# Patient Record
(demographics unavailable — no encounter records)

---

## 2024-11-09 NOTE — DISCUSSION/SUMMARY
[FreeTextEntry1] : Arely is a 9 y.o female presenting for sore throat. Physical exam with pharyngitis- rapid strep positive for GAS. Discussed diagnosis, supportive care and Amoxicillin course. RTO as needed.

## 2024-11-09 NOTE — HISTORY OF PRESENT ILLNESS
[de-identified] : Sore throat  [FreeTextEntry6] : Arely is a 9 y.o female presenting for sore throat and vomiting. No fever. Concerned about strep. Drinking well.

## 2025-01-06 NOTE — DISCUSSION/SUMMARY
[FreeTextEntry1] :  Nine year old female WELL CHILD. Continue balanced diet with all food groups. Brush teeth twice a day with toothbrush. Recommend visit to dentist. Help child to maintain consistent daily routines and sleep schedule. School discussed. Ensure home is safe. Teach child about personal safety. Use consistent, positive discipline. Limit screen time to no more than 2 hours per day. Encourage physical activity.  Return 1 year for routine well child check.

## 2025-01-06 NOTE — HISTORY OF PRESENT ILLNESS
[Parents] : parents [Fruit] : fruit [Vegetables] : vegetables [Meat] : meat [Grains] : grains [Eggs] : eggs [Fish] : fish [Dairy] : dairy [Eats meals with family] : eats meals with family [___ stools every other day] : [unfilled]  stools every other day [Firm] : stools are firm consistency [___ voids per day] : [unfilled] voids per day [Normal] : Normal [In own bed] : In own bed [Sleeps ___ hours per night] : sleeps [unfilled] hours per night [Brushing teeth twice/d] : brushing teeth twice per day [Yes] : Patient goes to dentist yearly [Toothpaste] : Primary Fluoride Source: Toothpaste [Premenarche] : premenarche [Appropiate parent-child-sibling interaction] : appropriate parent-child-sibling interaction [Grade ___] : Grade [unfilled] [Exposure to alcohol] : no exposure to alcohol [Exposure to tobacco] : no exposure to tobacco [Exposure to electronic nicotine delivery system] : No exposure to electronic nicotine delivery system [Appropriately restrained in motor vehicle] : appropriately restrained in motor vehicle [Exposure to illicit drugs] : no exposure to illicit drugs [Supervised outdoor play] : supervised outdoor play [Wears helmet and pads] : wears helmet and pads [Monitored computer use] : monitored computer use [Up to date] : Up to date [de-identified] :  [FreeTextEntry1] :  9 year female brought to the office for Well . Has been doing well, appetite is good, sleeps well, voiding and stooling normally. Growth and development is appropriate for age

## 2025-03-22 NOTE — REVIEW OF SYSTEMS
[Sore Throat] : sore throat [Vomiting] : vomiting [Abdominal Pain] : abdominal pain [Myalgia] : myalgia [Polyuria] : polyuria [Negative] : Heme/Lymph

## 2025-03-22 NOTE — HISTORY OF PRESENT ILLNESS
[de-identified] : Back pain  [FreeTextEntry6] : Arely is a 9 y.o female presenting for back pain. abdominal pain and polyuria. Denies any pain with urination but has been going more frequently. With back pain for months as per parents- she does play basketball but no known injury or trauma. Now with left shoulder pain and lower abdominal pain. No N/V/D. No cough or congestion. Premenarchal.

## 2025-03-22 NOTE — DISCUSSION/SUMMARY
[FreeTextEntry1] : Arely is a 9 y.o female presenting for back pain and polyuria. Exam with some flank tenderness and lower abdominal tenderness on exam. POCT urinalysis with blood- discussed official UA/UC and start of antibiotics course for potential UTI. Discussed follow up after results and potential imaging for back given chronic pain. RTO as needed.

## 2025-04-01 NOTE — HISTORY OF PRESENT ILLNESS
[de-identified] : patient with sore throat for a few days and no fever and stomache no vomiting or diahrea

## 2025-04-01 NOTE — DISCUSSION/SUMMARY
[FreeTextEntry1] : Patient likely with viral pharyngitis. Rapid strep perfromed in office is negative. Will send throat culture to rule out strep. Recommend supportive care with antipyretics, salt water gargles, and if age-appropriate throat lozenges. mild red cheecks- ? fifths disease

## 2025-04-16 NOTE — DATA REVIEWED
[de-identified] : 4/1/25: AP and lateral full-length spine x-ray have been previously obtained and independently reviewed today on 4/15/2025 Right thoracic curve measuring less than 25 degrees. Open triradiate cartilage.

## 2025-04-16 NOTE — DATA REVIEWED
[de-identified] : 4/1/25: AP and lateral full-length spine x-ray have been previously obtained and independently reviewed today on 4/15/2025 Right thoracic curve measuring less than 25 degrees. Open triradiate cartilage.

## 2025-04-16 NOTE — PHYSICAL EXAM
[FreeTextEntry1] : General: Patient is awake and alert and in no acute distress, oriented to person, place, and time. Well developed, well nourished, cooperative.   Skin: The skin is intact, warm, pink, and dry over the area examined.    Eyes: normal conjunctiva, normal eyelids and pupils were equal and round.   ENT: normal ears, normal nose and normal lips.  Cardiovascular: There is brisk capillary refill in the digits of the affected extremity. They are symmetric pulses in the bilateral upper and lower extremities, positive peripheral pulses, brisk capillary refill, but no peripheral edema.  Respiratory: The patient is in no apparent respiratory distress. They're taking full deep breaths without use of accessory muscles or evidence of audible wheezes or stridor without the use of a stethoscope, normal respiratory effort.   Neurological: 5/5 motor strength in the main muscle groups of bilateral lower extremities, sensory intact in bilateral lower extremities.   Musculoskeletal: Neurological examination reveals a grade 5/5 muscle power. Deep tendon reflexes are 1+ with ankle jerk and knee jerk.  The plantars are bilaterally down going.  Superficial abdominal reflexes are symmetric and intact.  The biceps and triceps reflexes are 1+.  The Cora test is negative.  There is no asymmetry of calves, no significant leg length discrepancy or significant cafe-au-lait spots. Abdominal reflexes in all 4 quadrants present.    Examination of both the upper and lower extremities: No obvious abnormalities. 5/5 muscle strength bilaterally.  There is no gross deformity.  Patient has full range of motion of both the hips, knees, ankles, wrists, elbows, and shoulders.  Neck range of motion is full and free without any pain or spasm. Normal appearing fingers and toes. No large birthmarks noted. DTR's are intact.  Examination of back: Examination of the back reveals significant shoulder asymmetry. Significant waist asymmetry.  On forward bending, mild right thoracic and mild left thoracolumbar prominence noted.  Patient is able to bend forward and touch the toes as well bend backwards without pain.  Lateral flexion is symmetrical and is pain free.  Straight leg raising test is free to more than 70 degrees.   Neurological examination reveals a grade 5/5 muscle power.  Sensation is intact to crude touch and pinprick.  Deep tendon reflexes are 1+ with ankle jerk and knee jerk.  The plantars are bilaterally down going.  Superficial abdominal reflexes are symmetric and intact.  The biceps and triceps reflexes are 1+.     There is no hairy patch, lipoma, sinus in the back.  There is no pes cavus, asymmetry of calves, significant leg length discrepancy or significant cafe-au-lait spots.  Child is able to walk on tiptoes as well as heels without difficulty or pain. Child is able to jump and squat

## 2025-04-16 NOTE — ASSESSMENT
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 Clinical exam and imaging reviewed with parent and patient at length. Natural history discussed.  Child is 9 years of age, Risser 0, premenarchal.  Patient has significant skeletal growth potential.  Scoliosis can progress with time and growth.  Scoliosis currently measures about 13 degrees.  Observation only has been recommended.  Treatment algorithm for scoliosis has been discussed.  Bracing is warranted for curves measuring greater than 25 degrees with skeletal growth remaining.  The parent understands that the braces do not correct curves permanently and that there is 30% risk brace failure. Parent understands the risk of curve progression needing surgery. Surgery is recommended for scoliosis measuring greater than 45 degrees.  I have recommended regular exercise for back and core strengthening and postural support.  Home exercise regimen with exercises to be done at least 5 days a week has also been recommended.  Home exercise handout sheet has been provided. A prescription for outpatient physical therapy has also been provided. Activities as tolerated.  I have recommended follow-up in 6 months with AP and lateral spine x-ray at that time.  All questions answered, understanding verbalized. Patient and family in agreement with plan of care.  Natural history of spine deformity discussed. Risk of progression explained. Spine deformity can cause back pain later on and also arthritis, though usually later.. Spine deformity can affect organ systems,such as lungs, less commonly heart and GI etc over time depending on curve size and progression.Deformity can progress with growth but can continue to progress later on based on the size of the curve. It can also effect patient's height due to the curve..It usually does not impact activities and has no limitations, however activities may be limited due to pain or rarely breathlessness with large curves. Scoliosis is usually not impacted by daily activities- sleeping position, sitting position, lifting heavy weights etc, however posture and back pain can be affected by some of these.Stretching, exercises, bone health and nutrition are important factors in the long run.Spine deformity may have genetics etiology and so siblings and progenies should be evaluated.For scoliosis, curves less than 25 degrees are usually managed with observation. Bracing is warranted for curves measuring greater than 25 degrees with skeletal growth remaining.  Braces do not correct curves permanently and there is a 30% risk brace failure. Surgery is recommended for scoliosis measuring greater than 45 degrees.  This note was generated using Dragon medical dictation software. A reasonable effort has been made for proofreading its contents, but typos may still remain. If there are any questions or points of clarification needed please do not hesitate to contact my office.  We spent 45 minutes on HPI, Clinical exam, ordering/ reviewing all imaging, reviewing any existing record, reviewing findings and counseling patient to treatment, differentials, etiology, prognosis, natural history, implications on ADLs, activities limitations/modifications,  answering questions and addressing concerns, treatment goals and documenting in the EHR.

## 2025-04-16 NOTE — HISTORY OF PRESENT ILLNESS
[3] : currently ~his/her~ pain is 3 out of 10 [FreeTextEntry1] : 9 year female presents today to the clinic with her parents for an initial evaluation regarding their spinal asymmetries. She was seen by the pediatrician for history of back pain.  This has been ongoing for the past few months when laying down to go to sleep and when awakening in the morning.  She denies back pain during the day.  She participates in full activity including basketball without issue.  Pediatrician obtained x-rays which are available for review.  X-rays revealed spinal asymmetries and PMD advised family to follow up with an orthopedist. Patient has been participating in all of her normal physical activities without restrictions or discomforts.  She is pre-menarchal but parents report evidence of puberty including large growth in height. Patient denies any acute traumas or recent injuries. Patient denies any back pain, radiating pain, numbness, tingling sensations, weakness to the upper or lower extremities, radiating pain throughout extremities, and urinary/bowel incontinence.She presents today for further evaluation and management regarding the same [de-identified] : lying down

## 2025-04-16 NOTE — HISTORY OF PRESENT ILLNESS
[3] : currently ~his/her~ pain is 3 out of 10 [FreeTextEntry1] : 9 year female presents today to the clinic with her parents for an initial evaluation regarding their spinal asymmetries. She was seen by the pediatrician for history of back pain.  This has been ongoing for the past few months when laying down to go to sleep and when awakening in the morning.  She denies back pain during the day.  She participates in full activity including basketball without issue.  Pediatrician obtained x-rays which are available for review.  X-rays revealed spinal asymmetries and PMD advised family to follow up with an orthopedist. Patient has been participating in all of her normal physical activities without restrictions or discomforts.  She is pre-menarchal but parents report evidence of puberty including large growth in height. Patient denies any acute traumas or recent injuries. Patient denies any back pain, radiating pain, numbness, tingling sensations, weakness to the upper or lower extremities, radiating pain throughout extremities, and urinary/bowel incontinence.She presents today for further evaluation and management regarding the same [de-identified] : lying down

## 2025-05-27 NOTE — REVIEW OF SYSTEMS
[Negative] : Genitourinary [Fever] : fever [Sore Throat] : sore throat [Appetite Changes] : appetite changes

## 2025-05-27 NOTE — DISCUSSION/SUMMARY
[FreeTextEntry1] : 10 year girl found to be rapid strep positive. Complete 10 days of antibiotics. Use antipyretics as needed. Return for follow up in 2 weeks. After being on antibiotics for atleast 24 hours patient less likely to spread infection. Recommend increased dietary fiber and probiotic. Advised using miralax, titrating to effect. Return if symptoms worsen or persist. discuss increase MiraLAX at length and possible gi referral or lactose trial of elimination

## 2025-05-27 NOTE — HISTORY OF PRESENT ILLNESS
[de-identified] : patient is here for fever and sore throat  [FreeTextEntry6] : constipation  using miralax 1 cup each day seen in ed for pain seen about a week ago long history of constipation and seeing gi

## 2025-06-02 NOTE — PHYSICAL EXAM
[Mucoid Discharge] : mucoid discharge [Inflamed Nasal Mucosa] : inflamed nasal mucosa [Erythematous Oropharynx] : erythematous oropharynx [Wheezing] : wheezing [Rales] : rales [Crackles] : crackles [NL] : warm, clear

## 2025-06-02 NOTE — HISTORY OF PRESENT ILLNESS
[FreeTextEntry6] : Ten year old female brought to the office because of congested cough, wet cough that is worse at night. She is on day 5/10 of Amoxil for strep pharyngitis ( seen on 5/27). Her fever and throat are ok now but still not herself with fatigue/malaise . Cough much worse at night.

## 2025-06-02 NOTE — DISCUSSION/SUMMARY
[FreeTextEntry1] : Ten year old female under treatment for strep but now with clinical bronchopneumonia as well as wheezing. An albuterol treatment given in the office. Still with end expiratory wheeze. Will add Azithromycin to the Amoxil and reevaluate in 3 days. Also start on Albuterol every 6 hours.

## 2025-06-24 NOTE — PLAN
[FreeTextEntry1] : - Thank you for seeing us today!   Labs: - Please have labs (bloodwork) performed. - These can be done at any time of day (no need to be fasting) - An appointment is not needed for most tests at [facility] For locations and information: [facility]/labs   Test results: - We will contact you with results and recommendations   Pain medication:  - We recommend taking an NSAID (ibuprofen or naproxen) -- dosing based on Arely's weight.  - You can alternate with Tylenol if desired.  Torsion precautions:  - Please contact us if you have severe pain that is not relieved by pain medication - Please avoid tumbling, handstands, roller coasters, or other activities that could increase the risk.    Follow-up: - We will contact you to schedule surgery and any other appointments needed.   ----- To contact the Pediatric & Adolescent Gynecology team: - phone: [phone] -- option 1 for appointments, option 2 for clinical questions - patient portal (available for ages <13 or 18+ through [employer]/website) - email: [email or web address] (for non-urgent requests/records)   Office locations for Dr. Pemberton and Dr. Rosales: - [facility] (main office) [address], [place] [address] - [facility] [place] (Dr. Pemberton only) [address], [address], [place] [address] - [facility] (Dr. Rosales only) [address], [address], [place] [address]

## 2025-06-24 NOTE — ASSESSMENT
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  Has had episodic pain a few times a week recently -- per parents, unclear if related to cyst vs anxiety -- constipation is improved at this time.   We had a detailed discussion including the following:  - nature of dermoid cysts, potential for growth - risk of torsion and its management  - approaches to surgery, benefits & risks  - additional evaluation with labs, tumor markers   Regardless of whether her pain is caused by the cyst (e.g. from intermittent torsion) or other factors, my recommendation is to plan for surgery in the near future: laparoscopic R ovarian cystectomy.  Counseled on details of the surgery, benefits and risks, typical approach and operative course, post-op expectations and recovery.  We discussed timing, taking into consideration [place] upcoming [place] travel.  Discussed torsion precautions in the interim.   Next steps will include:  - labs including ovarian tumor markers, hormones  - surgery scheduling (likely August 2025) - torsion precautions until then  - recommend repeat sono pre-op to assess size, as this may influence surgical approach.   Plan & recommendations shared with patient/family as below.  All questions were answered, and understanding was expressed. They were encouraged to contact us with additional questions or concerns.   Loraine Pemberton MD Director, Pediatric & Adolescent Gynecology [facility] Office: [phone]

## 2025-06-24 NOTE — PLAN
[FreeTextEntry1] : - Thank you for seeing us today!   Labs: - Please have labs (bloodwork) performed. - These can be done at any time of day (no need to be fasting) - An appointment is not needed for most tests at [facility] For locations and information: [facility]/labs   Test results: - We will contact you with results and recommendations   Pain medication:  - We recommend taking an NSAID (ibuprofen or naproxen) -- dosing based on Arely's weight.  - You can alternate with Tylenol if desired.  Torsion precautions:  - Please contact us if you have severe pain that is not relieved by pain medication - Please avoid tumbling, handstands, roller coasters, or other activities that could increase the risk.    Follow-up: - We will contact you to schedule surgery and any other appointments needed.   ----- To contact the Pediatric & Adolescent Gynecology team: - phone: [phone] -- option 1 for appointments, option 2 for clinical questions - patient portal (available for ages <13 or 18+ through [employer]/website) - email: [email or web address] (for non-urgent requests/records)   Office locations for Dr. Pemberton and Dr. Rosales: - [facility] (main office) [address], [address], [place] [address] - [facility] [place] (Dr. Pemberton only) [address], [place] [address] - [facility] [place] (Dr. Rosales only) [address], [address], [place] [address]

## 2025-06-24 NOTE — ASSESSMENT
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  Has had episodic pain a few times a week recently -- per parents, unclear if related to cyst vs anxiety -- constipation is improved at this time.   We had a detailed discussion including the following:  - nature of dermoid cysts, potential for growth - risk of torsion and its management  - approaches to surgery, benefits & risks  - additional evaluation with labs, tumor markers   Regardless of whether her pain is caused by the cyst (e.g. from intermittent torsion) or other factors, my recommendation is to plan for surgery in the near future: laparoscopic R ovarian cystectomy.  Counseled on details of the surgery, benefits and risks, typical approach and operative course, post-op expectations and recovery.  We discussed timing, taking into consideration their upcoming overseas travel.  Discussed torsion precautions in the interim.   Next steps will include:  - labs including ovarian tumor markers, hormones  - surgery scheduling (likely August 2025) - torsion precautions until then  - recommend repeat sono pre-op to assess size, as this may influence surgical approach.   Plan & recommendations shared with patient/family as below.  All questions were answered, and understanding was expressed. They were encouraged to contact us with additional questions or concerns.   Loraine Pemberton MD Director, Pediatric & Adolescent Gynecology [facility] Office: [phone]

## 2025-06-24 NOTE — HISTORY OF PRESENT ILLNESS
[FreeTextEntry1] : Pediatric & Adolescent Gynecology: New Patient Visit   HAN is a(n) 10 year old female ( 2015) presenting for 3.8 cm right ovarian dermoid.    Referred by:  PCP: Dr. Dat Hudson   Review of history from records: 25 X-Ray of spine for c/o back aches  Incidental finding: soft tissue calcification /ossification on the pelvis size of 1.4cmx.9cm. This may represent an ovarian dermoid. Will get pelvic U/S to evaluate further.  25 Pelvic US  3.6 cm mass in the region of the right adnexa with internal echogenic foci which many be an ovarian dermoid. MRI of the pelvis with contrast is recommended for confirmation.   25 MRI Pelvis w/ and w/o contrast  Arising from the right ovary is a well-defined lobulated complex cyst measuring 4.1 x 3.4 x 3.7 cm containing fatty component diagnostic of a dermoid cyst    TODAY 2025: pt is here with mom & dad  Intake history:  They share that she also went to [facility] ED 5/15/25 for lower quadrant pain  Pelvic US showed 3.8 cm right-sided dermoid. No evidence of ovarian torsion at time of exam. D/C home with pain recs and was told she was constipated   Took a ton of Miralax to clean her out  Now taking Probiotics  Has been keeping her regular   Mom says Han will have pain about 3 days a week  Most recently -  night  Mom is not giving her pain medication - despite Han asking  Mom says she does not want to make it a habit   Additional history with MD:  Dad shares that Han had quite severe pain a few days ago despite the constipation being more well-controlled now  Dad notes, the pain is not reproducible by any palpation or action Pain seems to happen more when she lays down  Han says, she's fine during the day, but it's worse at night Mom says, during the day if her belly hurts, it's not a significant thing   Han will just push her belly against a desk and that helps some  She describes it as a stabbing pain that is light, gets worse, then a bit better, then worse again Last episode lasted about an hour   They have not given her any pain medication  Dad says, they feel that anxiety plays a big role  and that once they calm her down, the pain seems to subside Mom says, there seems to be "a lot of drama" (crying/screaming) when this happens which is not typical of her   They do not feel that constipation is playing a role anymore  She is no longer on Miralax and is now only on supplements  has seen her PCP  last saw her gastro about 2 years ago - has been dealing w/ this for some time  milk bothers her - she will have to run to the bathroom   They reviewed her history of symptoms and imaging:  Han started complaining of back pain in 2025 it continued and they felt it wasn't normal  went to PCP  Cyst was first suspected on X-ray on   then identified on pelvic US on   2nd US was in the ER on 5/15  X-ray at the time showed she was 'full of stool'  MRI was then done on 25  Currently: Han feels comfortable Not having pain at the moment nor during exam today

## 2025-06-24 NOTE — PLAN
[FreeTextEntry1] : - Thank you for seeing us today!   Labs: - Please have labs (bloodwork) performed. - These can be done at any time of day (no need to be fasting) - An appointment is not needed for most tests at [facility] For locations and information: [facility]/labs   Test results: - We will contact you with results and recommendations   Pain medication:  - We recommend taking an NSAID (ibuprofen or naproxen) -- dosing based on Arely's weight.  - You can alternate with Tylenol if desired.  Torsion precautions:  - Please contact us if you have severe pain that is not relieved by pain medication - Please avoid tumbling, handstands, roller coasters, or other activities that could increase the risk.    Follow-up: - We will contact you to schedule surgery and any other appointments needed.   ----- To contact the Pediatric & Adolescent Gynecology team: - phone: [phone] -- option 1 for appointments, option 2 for clinical questions - patient portal (available for ages <13 or 18+ through [email or web address]) - email: [email or web address] (for non-urgent requests/records)   Office locations for Dr. Pemberton and Dr. Rosales: - [facility] (main office) [address], [address], [place] [address] - [facility] [place] (Dr. Pemberton only) [address], [address], [place] [address] - [facility] [place] (Dr. Rosales only) [address], [address], [place] [address]

## 2025-06-24 NOTE — ASSESSMENT
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  Has had episodic pain a few times a week recently -- per parents, unclear if related to cyst vs anxiety -- constipation is improved at this time.   We had a detailed discussion including the following:  - nature of dermoid cysts, potential for growth - risk of torsion and its management  - approaches to surgery, benefits & risks  - additional evaluation with labs, tumor markers   Regardless of whether her pain is caused by the cyst (e.g. from intermittent torsion) or other factors, my recommendation is to plan for surgery in the near future: laparoscopic R ovarian cystectomy.  Counseled on details of the surgery, benefits and risks, typical approach and operative course, post-op expectations and recovery.  We discussed timing, taking into consideration [place] upcoming [place] travel.  Discussed torsion precautions in the interim.   Next steps will include:  - labs including ovarian tumor markers, hormones  - surgery scheduling (likely August 2025) - torsion precautions until then  - recommend repeat sono pre-op to assess size, as this may influence surgical approach.   Plan & recommendations shared with patient/family as below.  All questions were answered, and understanding was expressed. They were encouraged to contact us with additional questions or concerns.   Loraine Pemberton MD Director, Pediatric & Adolescent Gynecology [facility] Office: [phone]

## 2025-06-24 NOTE — PLAN
[FreeTextEntry1] : - Thank you for seeing us today!   Labs: - Please have labs (bloodwork) performed. - These can be done at any time of day (no need to be fasting) - An appointment is not needed for most tests at [facility] For locations and information: [facility]/labs   Test results: - We will contact you with results and recommendations   Pain medication:  - We recommend taking an NSAID (ibuprofen or naproxen) -- dosing based on Arely's weight.  - You can alternate with Tylenol if desired.  Torsion precautions:  - Please contact us if you have severe pain that is not relieved by pain medication - Please avoid tumbling, handstands, roller coasters, or other activities that could increase the risk.    Follow-up: - We will contact you to schedule surgery and any other appointments needed.   ----- To contact the Pediatric & Adolescent Gynecology team: - phone: [phone] -- option 1 for appointments, option 2 for clinical questions - patient portal (available for ages <13 or 18+ through [email or web address]) - email: [email or web address] (for non-urgent requests/records)   Office locations for Dr. Pemberton and Dr. Rsoales: - [facility] (main office) [address], [address], [place] [address] - [facility] [place] (Dr. Pemberton only) [address], [address], [place] [address] - [facility] [place] (Dr. Rosales only) [address], [address], [place] [address]

## 2025-06-24 NOTE — HISTORY OF PRESENT ILLNESS
[FreeTextEntry1] : Pediatric & Adolescent Gynecology: New Patient Visit   HAN is a(n) 10 year old female ( 2015) presenting for 3.8 cm right ovarian dermoid.    Referred by:  PCP: Dr. Dat Hudson   Review of history from records: 25 X-Ray of spine for c/o back aches  Incidental finding: soft tissue calcification /ossification on the pelvis size of 1.4cmx.9cm. This may represent an ovarian dermoid. Will get pelvic U/S to evaluate further.  25 Pelvic US  3.6 cm mass in the region of the right adnexa with internal echogenic foci which many be an ovarian dermoid. MRI of the pelvis with contrast is recommended for confirmation.   25 MRI Pelvis w/ and w/o contrast  Arising from the right ovary is a well-defined lobulated complex cyst measuring 4.1 x 3.4 x 3.7 cm containing fatty component diagnostic of a dermoid cyst    TODAY 2025: pt is here with mom & dad  Intake history:  They share that [facility] also [facility] for lower quadrant pain  Pelvic US showed 3.8 cm right-sided dermoid. No evidence of ovarian torsion at time of exam. D/C home with pain recs and was told she was constipated   Took a ton of Miralax to clean her out  Now taking Probiotics  Has been keeping her regular   Mom says Han will have pain about 3 days a week  Most recently -  night  Mom is not giving her pain medication - despite Han asking  Mom says she does not want to make it a habit   Additional history with MD:  Dad shares that Han had quite severe pain a few days ago despite the constipation being more well-controlled now  Dad notes, the pain is not reproducible by any palpation or action Pain seems to happen more when she lays down  Han says, she's fine during the day, but it's worse at night Mom says, during the day if her belly hurts, it's not a significant thing   Han will just push her belly against a desk and that helps some  She describes it as a stabbing pain that is light, gets worse, then a bit better, then worse again Last episode lasted about an hour   They have not given her any pain medication  Dad says, they feel that anxiety plays a big role  and that once they calm her down, the pain seems to subside Mom says, there seems to be "a lot of drama" (crying/screaming) when this happens which is not typical of her   They do not feel that constipation is playing a role anymore  She is no longer on Miralax and is now only on supplements  has seen her PCP  last saw her gastro about 2 years ago - has been dealing w/ this for some time  milk bothers her - she will have to run to the bathroom   They reviewed her history of symptoms and imaging:  Han started complaining of back pain in 2025 it continued and they felt it wasn't normal  went to PCP  Cyst was first suspected on X-ray on   then identified on pelvic US on   2nd US was in the ER on 5/15  X-ray at the time showed she was 'full of stool'  MRI was then done on 25  Currently: Han feels comfortable Not having pain at the moment nor during exam today

## 2025-06-24 NOTE — ASSESSMENT
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  Has had episodic pain a few times a week recently -- per parents, unclear if related to cyst vs anxiety -- constipation is improved at this time.   We had a detailed discussion including the following:  - nature of dermoid cysts, potential for growth - risk of torsion and its management  - approaches to surgery, benefits & risks  - additional evaluation with labs, tumor markers   Regardless of whether her pain is caused by the cyst (e.g. from intermittent torsion) or other factors, my recommendation is to plan for surgery in the near future: laparoscopic R ovarian cystectomy.  Counseled on details of the surgery, benefits and risks, typical approach and operative course, post-op expectations and recovery.  We discussed timing, taking into consideration their upcoming overseas travel.  Discussed torsion precautions in the interim.   Next steps will include:  - labs including ovarian tumor markers, hormones  - surgery scheduling (likely August 2025) - torsion precautions until then  - recommend repeat sono pre-op to assess size, as this may influence surgical approach.   Plan & recommendations shared with patient/family as below.  All questions were answered, and understanding was expressed. They were encouraged to contact us with additional questions or concerns.   Loraine Pemberton MD Director, Pediatric & Adolescent Gynecology [facility] Office: [phone]

## 2025-06-24 NOTE — HISTORY OF PRESENT ILLNESS
[FreeTextEntry1] : Pediatric & Adolescent Gynecology: New Patient Visit   HAN is a(n) 10 year old female ( 2015) presenting for 3.8 cm right ovarian dermoid.    Referred by:  PCP: Dr. Dat Hudson   Review of history from records: 25 X-Ray of spine for c/o back aches  Incidental finding: soft tissue calcification /ossification on the pelvis size of 1.4cmx.9cm. This may represent an ovarian dermoid. Will get pelvic U/S to evaluate further.  25 Pelvic US  3.6 cm mass in the region of the right adnexa with internal echogenic foci which many be an ovarian dermoid. MRI of the pelvis with contrast is recommended for confirmation.   25 MRI Pelvis w/ and w/o contrast  Arising from the right ovary is a well-defined lobulated complex cyst measuring 4.1 x 3.4 x 3.7 cm containing fatty component diagnostic of a dermoid cyst    TODAY 2025: pt is here with mom & dad  Intake history:  They share that [place] [facility] [place] for lower quadrant pain  Pelvic US showed 3.8 cm right-sided dermoid. No evidence of ovarian torsion at time of exam. D/C home with pain recs and was told she was constipated   Took a ton of Miralax to clean her out  Now taking Probiotics  Has been keeping her regular   Mom says Han will have pain about 3 days a week  Most recently -  night  Mom is not giving her pain medication - despite Han asking  Mom says she does not want to make it a habit   Additional history with MD:  Dad shares that Han had quite severe pain a few days ago despite the constipation being more well-controlled now  Dad notes, the pain is not reproducible by any palpation or action Pain seems to happen more when she lays down  Han says, she's fine during the day, but it's worse at night Mom says, during the day if her belly hurts, it's not a significant thing   Han will just push her belly against a desk and that helps some  She describes it as a stabbing pain that is light, gets worse, then a bit better, then worse again Last episode lasted about an hour   They have not given her any pain medication  Dad says, they feel that anxiety plays a big role  and that once they calm her down, the pain seems to subside Mom says, there seems to be "a lot of drama" (crying/screaming) when this happens which is not typical of her   They do not feel that constipation is playing a role anymore  She is no longer on Miralax and is now only on supplements  has seen her PCP  last saw her gastro about 2 years ago - has been dealing w/ this for some time  milk bothers her - she will have to run to the bathroom   They reviewed her history of symptoms and imaging:  Han started complaining of back pain in 2025 it continued and they felt it wasn't normal  went to PCP  Cyst was first suspected on X-ray on   then identified on pelvic US on   2nd US was in the ER on 5/15  X-ray at the time showed she was 'full of stool'  MRI was then done on 25  Currently: Han feels comfortable Not having pain at the moment nor during exam today

## 2025-06-24 NOTE — END OF VISIT
Requested Prescriptions     Pending Prescriptions Disp Refills    VICTOZA 18 MG/3ML SOPN SC injection [Pharmacy Med Name: Victoza 18 MG/3ML Subcutaneous Solution Pen-injector] 27 mL 11     Sig: INJECT 1.8MG SUBCUTANEOUSLY DAILY. [Time Spent: ___ minutes] : I have spent [unfilled] minutes of time on the encounter which excludes teaching and separately reported services.

## 2025-06-24 NOTE — HISTORY OF PRESENT ILLNESS
[FreeTextEntry1] : Pediatric & Adolescent Gynecology: New Patient Visit   HAN is a(n) 10 year old female ( 2015) presenting for 3.8 cm right ovarian dermoid.    Referred by:  PCP: Dr. Dta Hudson   Review of history from records: 25 X-Ray of spine for c/o back aches  Incidental finding: soft tissue calcification /ossification on the pelvis size of 1.4cmx.9cm. This may represent an ovarian dermoid. Will get pelvic U/S to evaluate further.  25 Pelvic US  3.6 cm mass in the region of the right adnexa with internal echogenic foci which many be an ovarian dermoid. MRI of the pelvis with contrast is recommended for confirmation.   25 MRI Pelvis w/ and w/o contrast  Arising from the right ovary is a well-defined lobulated complex cyst measuring 4.1 x 3.4 x 3.7 cm containing fatty component diagnostic of a dermoid cyst    TODAY 2025: pt is here with mom & dad  Intake history:  They share that she also went [place] [facility] [place] for lower quadrant pain  Pelvic US showed 3.8 cm right-sided dermoid. No evidence of ovarian torsion at time of exam. D/C home with pain recs and was told she was constipated   Took a ton of Miralax to clean her out  Now taking Probiotics  Has been keeping her regular   Mom says Han will have pain about 3 days a week  Most recently -  night  Mom is not giving her pain medication - despite Han asking  Mom says she does not want to make it a habit   Additional history with MD:  Dad shares that Han had quite severe pain a few days ago despite the constipation being more well-controlled now  Dad notes, the pain is not reproducible by any palpation or action Pain seems to happen more when she lays down  Han says, she's fine during the day, but it's worse at night Mom says, during the day if her belly hurts, it's not a significant thing   Han will just push her belly against a desk and that helps some  She describes it as a stabbing pain that is light, gets worse, then a bit better, then worse again Last episode lasted about an hour   They have not given her any pain medication  Dad says, they feel that anxiety plays a big role  and that once they calm her down, the pain seems to subside Mom says, there seems to be "a lot of drama" (crying/screaming) when this happens which is not typical of her   They do not feel that constipation is playing a role anymore  She is no longer on Miralax and is now only on supplements  has seen her PCP  last saw her gastro about 2 years ago - has been dealing w/ this for some time  milk bothers her - she will have to run to the bathroom   They reviewed her history of symptoms and imaging:  Han started complaining of back pain in 2025 it continued and they felt it wasn't normal  went to PCP  Cyst was first suspected on X-ray on   then identified on pelvic US on   2nd US was in the ER on 5/15  X-ray at the time showed she was 'full of stool'  MRI was then done on 25  Currently: Han feels comfortable Not having pain at the moment nor during exam today